# Patient Record
Sex: FEMALE | Race: WHITE | Employment: FULL TIME | URBAN - METROPOLITAN AREA
[De-identification: names, ages, dates, MRNs, and addresses within clinical notes are randomized per-mention and may not be internally consistent; named-entity substitution may affect disease eponyms.]

---

## 2019-03-19 ENCOUNTER — OFFICE VISIT (OUTPATIENT)
Dept: FAMILY MEDICINE | Facility: CLINIC | Age: 29
End: 2019-03-19
Payer: COMMERCIAL

## 2019-03-19 VITALS
BODY MASS INDEX: 22.66 KG/M2 | DIASTOLIC BLOOD PRESSURE: 68 MMHG | WEIGHT: 141 LBS | SYSTOLIC BLOOD PRESSURE: 114 MMHG | RESPIRATION RATE: 14 BRPM | HEIGHT: 66 IN | OXYGEN SATURATION: 100 % | TEMPERATURE: 98.1 F | HEART RATE: 66 BPM
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
## 2019-03-19 DIAGNOSIS — Z23 NEED FOR VACCINATION: ICD-10-CM

## 2019-03-19 PROCEDURE — 90715 TDAP VACCINE 7 YRS/> IM: CPT | Performed by: OBSTETRICS & GYNECOLOGY

## 2019-03-19 PROCEDURE — 90471 IMMUNIZATION ADMIN: CPT | Performed by: OBSTETRICS & GYNECOLOGY

## 2019-03-19 PROCEDURE — 99213 OFFICE O/P EST LOW 20 MIN: CPT | Mod: 25 | Performed by: OBSTETRICS & GYNECOLOGY

## 2019-03-19 RX ORDER — NORGESTIMATE AND ETHINYL ESTRADIOL 0.25-0.035
1 KIT ORAL DAILY
COMMUNITY

## 2019-03-19 RX ORDER — AMOXICILLIN AND CLAVULANATE POTASSIUM 500; 125 MG/1; MG/1
TABLET, FILM COATED ORAL
Qty: 15 TABLET | Refills: 0 | Status: SHIPPED | OUTPATIENT
Start: 2019-03-19

## 2019-03-19 SDOH — HEALTH STABILITY: MENTAL HEALTH: HOW OFTEN DO YOU HAVE A DRINK CONTAINING ALCOHOL?: NEVER

## 2019-03-19 ASSESSMENT — PAIN SCALES - GENERAL: PAINLEVEL: NO PAIN (1)

## 2019-03-19 ASSESSMENT — MIFFLIN-ST. JEOR: SCORE: 1381.32

## 2019-03-19 NOTE — PROGRESS NOTES
Subjective:  She was bitten by her mother's little poodle 4 days ago. She washed it out well with soap and water.  Since then it has not hurt.  She has had it dressed daily with a bandage and Neosporin.  Tetanus shot uncertain.  He has a little tingling just beyond the puncture wound but no pain whatsoever and no redness or swelling.      The past medical history, social history, past surgical history and family history as shown below have been reviewed by me today.  History reviewed. No pertinent past medical history.   No Known Allergies  Current Outpatient Medications   Medication Sig Dispense Refill     norgestimate-ethinyl estradiol (ORTHO-CYCLEN/SPRINTEC) 0.25-35 MG-MCG tablet Take 1 tablet by mouth daily       History reviewed. No pertinent surgical history.  Social History     Socioeconomic History     Marital status:      Spouse name: None     Number of children: None     Years of education: None     Highest education level: None   Occupational History     None   Social Needs     Financial resource strain: None     Food insecurity:     Worry: None     Inability: None     Transportation needs:     Medical: None     Non-medical: None   Tobacco Use     Smoking status: Never Smoker     Smokeless tobacco: Never Used   Substance and Sexual Activity     Alcohol use: No     Frequency: Never     Drug use: No     Sexual activity: Yes     Partners: Male     Birth control/protection: None   Lifestyle     Physical activity:     Days per week: None     Minutes per session: None     Stress: None   Relationships     Social connections:     Talks on phone: None     Gets together: None     Attends [employer] orthodox service: None     Active member of club or organization: None     Attends meetings of clubs or organizations: None     Relationship status: None     Intimate partner violence:     Fear of current or ex partner: None     Emotionally abused: None     Physically abused: None     Forced sexual activity: None   Other  "Topics Concern     None   Social History Narrative     None     History reviewed. No pertinent family history.    ROS: A 12 point review of systems was done. Except for what is listed above in the HPI, the systems review is negative .      Objective: Vital signs: Blood pressure 114/68, pulse 66, temperature 98.1  F (36.7  C), temperature source Temporal, resp. rate 14, height 1.676 m (5' 6\"), weight 64 kg (141 lb), SpO2 100 %.    Exam of the finger reveals no tenderness at all.  There is no redness or swelling.  No discharge from the puncture wound.  It appears to be healing well.  It is a tiny laceration less than a centimeter long.  She has normal sensation along the sides of her finger all the way to the tip but right beyond the puncture there is a little tingling.  She has normal capillary refill in the finger.        Assessment/Plan: A total of 15 minutes were spent face-to-face with this patient during today's consultation, with more than 50% of that time devoted to conversation and counseling about the management decisions.      1.  Puncture wound/laceration of right second finger on the volar aspect between the middle phalanx and the distal phalanx.  No evidence of infection.  It is 4 days old already.  She washed it out well at the time of injury.  The dog is up-to-date on its vaccinations.  We will give her a Tdap today.  See Rx for Augmentin.. I explained that antibiotics can cause a rash or allergic reaction to develop and so the medication should be stopped if this occurs. Also there is a risk of diarrhea or clostridium difficile pseudomembranous enterocolitis with any antibiotic use so it should be stopped if diarrhea develops and then the clinic should be called so that we have a followup evaluation.      2.  I am giving her the antibiotic as prophylaxis but she knows to stop it if she develops any side effects    3.  Recheck in 1 week if this does not heal completely and sooner if she has any alarm " that that is infected.        DOT Munguia MD

## 2019-03-19 NOTE — NURSING NOTE
Prior to injection, verified patient identity using patient's name and date of birth.  Due to injection administration, patient instructed to remain in clinic for 15 minutes  afterwards, and to report any adverse reaction to me immediately.    TDaP    Drug Amount Wasted:  None.  Vial/Syringe: Single dose vial  Expiration Date:  12/15/2020  Bambi Kelley CMA

## 2019-03-22 ENCOUNTER — HEALTH MAINTENANCE LETTER (OUTPATIENT)
Age: 29
End: 2019-03-22

## 2020-03-11 ENCOUNTER — HEALTH MAINTENANCE LETTER (OUTPATIENT)
Age: 30
End: 2020-03-11

## 2021-01-03 ENCOUNTER — HEALTH MAINTENANCE LETTER (OUTPATIENT)
Age: 31
End: 2021-01-03

## 2021-04-25 ENCOUNTER — HEALTH MAINTENANCE LETTER (OUTPATIENT)
Age: 31
End: 2021-04-25

## 2021-07-02 ENCOUNTER — HOSPITAL ENCOUNTER (OUTPATIENT)
Dept: PHYSICAL THERAPY | Facility: CLINIC | Age: 31
Setting detail: THERAPIES SERIES
End: 2021-07-02
Attending: DENTIST
Payer: COMMERCIAL

## 2021-07-02 PROCEDURE — 97112 NEUROMUSCULAR REEDUCATION: CPT | Mod: GP | Performed by: PHYSICAL THERAPIST

## 2021-07-02 PROCEDURE — 97161 PT EVAL LOW COMPLEX 20 MIN: CPT | Mod: GP | Performed by: PHYSICAL THERAPIST

## 2021-07-02 PROCEDURE — 97110 THERAPEUTIC EXERCISES: CPT | Mod: GP | Performed by: PHYSICAL THERAPIST

## 2021-07-05 NOTE — PROGRESS NOTES
07/02/21 0815   General Information   Type of Visit Initial OP Ortho PT Evaluation   Start of Care Date 07/02/21   Referring Physician Geovanna Rodriguez PA-C ([facility])   Patient/Family Goals Statement Full range of movement, without pain. Bend, squat, kneel with knee.   Orders Evaluate and Treat   Date of Order 06/30/21   Certification Required? No   Medical Diagnosis s/p chondroplasty patella (right)   Surgical/Medical history reviewed Yes   Precautions/Limitations no known precautions/limitations   General Information Comments PMH: nothing to report per pt   Body Part(s)   Body Part(s) Knee   Presentation and Etiology   Pertinent history of current problem (include personal factors and/or comorbidities that impact the POC) Knee just slowly worse--maybe from bending with daughter.  In Jan 2021 noticed cartilege is torn and tried to heal it with ibuprofen, but did surgery to take off the torn piece 6/22/21 because ROM was limited, and a little pain on stairs.  Now, post surgery, foot pain mostly and some local sharper pain over insertion sites.     Impairments A. Pain;D. Decreased ROM;C. Swelling;H. Impaired gait   Functional Limitations perform activities of daily living;perform required work activities;perform desired leisure / sports activities   Symptom Location R knee   How/Where did it occur From insidious onset   Pain/symptoms exacerbated by   (squatting, bending knee)   Fall Risk Screen   Fall screen completed by PT   Have you fallen 2 or more times in the past year? No   Have you fallen and had an injury in the past year? No   Is patient a fall risk? No   Abuse Screen (yes response referral indicated)   Physical Signs of Abuse Present no   Functional Scales   Functional Scales Other   Other Scales  LEFS = 44/80   Knee Objective Findings   Side (if bilateral, select both right and left) Right   Observation decreased R knee ROM used    Posture holds knees and toes in when sitting   Gait/Locomotion uses  no AD, mild antalgic gait, poor heel strike and toe off   Knee ROM Comment seated flex 118 degrees, minus 2 degrees extension.    Knee/Hip Strength Comments bilat knee flex, ext, DF all 5/5.    Palpation mild anterior knee edema john-patellar, especially superior.   Right Quad Set Strength slow, inconsistent activation   R VMO Strength slow, inconsistent activation   Planned Therapy Interventions   Planned Therapy Interventions neuromuscular re-education;ROM;strengthening   Planned Therapy Interventions Comment manual possible to stretch mm in 3D fashion and help activation return   Clinical Impression   Criteria for Skilled Therapeutic Interventions Met yes, treatment indicated   PT Diagnosis R knee pain, s/p chondroplasty patella   Influenced by the following impairments pain, decreased ROM, impaired gait   Functional limitations due to impairments squatting, bending R knee, prolonged walking and standing   Clinical Presentation Stable/Uncomplicated   Clinical Decision Making (Complexity) Low complexity   Therapy Frequency other (see comments)  (1x in 2 weeks)   Predicted Duration of Therapy Intervention (days/wks) 2 visits then weekly to further control pain or each 2 weeks to progress HEP   Risk & Benefits of therapy have been explained Yes   Patient, Family & other staff in agreement with plan of care Yes   Clinical Impression Comments poor R quad activation s/p surgery   Education Assessment   Preferred Learning Style Listening   Barriers to Learning No barriers   ORTHO GOALS   PT Ortho Eval Goals 1;2   Ortho Goal 1   Goal Identifier squatting   Goal Description Svetlana will be able to squat down to children for daily activity without pain in R knee.    Target Date 08/13/21   Ortho Goal 2   Goal Identifier stairs   Goal Description Svetlana will be able to complete a flight of stairs without R knee pain.   Target Date 08/13/21   Total Evaluation Time   PT Eval, Low Complexity Minutes ([id number]) 25

## 2021-10-10 ENCOUNTER — HEALTH MAINTENANCE LETTER (OUTPATIENT)
Age: 31
End: 2021-10-10

## 2021-10-25 NOTE — PROGRESS NOTES
Outpatient Physical Therapy Discharge Note     Patient: Svetlana Ziegler  : 1990    Beginning/End Dates of Reporting Period:  21 to 21    Referring Provider: Geovanna Rodriguez PA-C ([facility])    Therapy Diagnosis: R knee pain, s/p chondroplasty patella     Client Self Report: Pt cancelled appt 21 reporting she is doing well and appt not needed.    Objective Measurements:   not present at time of discharge.    Goals:  Goal Identifier squatting   Goal Description Svetlana will be able to squat down to children for daily activity without pain in R knee.    Target Date 21   Date Met      Progress (detail required for progress note):       Goal Identifier stairs   Goal Description Svetlana will be able to complete a flight of stairs without R knee pain.   Target Date 21   Date Met      Progress (detail required for progress note):           Plan:  Discharge from therapy.    Discharge:    Reason for Discharge: Patient has met all goals, per her being comfortable to discharge.       Equipment Issued: .    Discharge Plan: Patient to continue home program.

## 2022-03-16 ENCOUNTER — TRANSFERRED RECORDS (OUTPATIENT)
Dept: HEALTH INFORMATION MANAGEMENT | Facility: CLINIC | Age: 32
End: 2022-03-16

## 2022-04-28 ENCOUNTER — TRANSFERRED RECORDS (OUTPATIENT)
Dept: HEALTH INFORMATION MANAGEMENT | Facility: CLINIC | Age: 32
End: 2022-04-28

## 2022-05-21 ENCOUNTER — HEALTH MAINTENANCE LETTER (OUTPATIENT)
Age: 32
End: 2022-05-21

## 2022-05-24 ENCOUNTER — VIRTUAL VISIT (OUTPATIENT)
Dept: URGENT CARE | Facility: CLINIC | Age: 32
End: 2022-05-24
Payer: COMMERCIAL

## 2022-05-24 DIAGNOSIS — U07.1 INFECTION DUE TO 2019 NOVEL CORONAVIRUS: Primary | ICD-10-CM

## 2022-05-24 PROCEDURE — 99204 OFFICE O/P NEW MOD 45 MIN: CPT | Mod: 95 | Performed by: EMERGENCY MEDICINE

## 2022-05-24 NOTE — PROGRESS NOTES
"Video visit:    Start time: 2:02 PM  Stop time: 2:12 PM    Duration: 10 minutes  The patient has been notified of following:     \"This video visit will be conducted via a call between you and your physician/provider. We have found that certain health care needs can be provided without the need for a physical exam.  This service lets us provide the care you need with a short phone conversation.  If a prescription is necessary we can send it directly to your pharmacy.  If lab work is needed we can place an order for that and you can then stop by our lab to have the test done at a later time.    Video visits are billed at different rates depending on your insurance coverage. During this emergency period, for some insurers they may be billed the same as an in-person visit.  Please reach out to your insurance provider with any questions.    If during the course of the call the physician/provider feels a telephone visit is not appropriate, you will not be charged for this service.\"    Patient has given verbal consent for video visit?  Yes    What phone number would you like to be contacted at? [phone]  How would you like to obtain your AVS? MyChart    Subjective   CC: Svetlana Ziegler  is a 31 year old female who presents via phone visit today for the following health issues:   Chief Complaint   Patient presents with     Infection        COVID-19 Symptom Review  How many days ago did these symptoms start? 1    Are any of the following symptoms significant for you?    New or worsening difficulty breathing? No    Worsening cough? Yes, it's a dry cough.     Fever or chills? No    Headache: YES-     Sore throat: no    Chest pain: no    Diarrhea: no    Body aches? YES-     What treatments has patient tried?    Does patient live in a nursing home, group home, or shelter? no  Does patient have a way to get food/medications during quarantined? Yes, I have a friend or family member who can help me. and Yes "                       Reviewed and updated as needed this visit by Provider                    Review of Systems         Objective    Patient is alert and oriented.  No acute distress.  Not dyspneic appearing on video visit.              Assessment/Plan:  Patient is a 31-year-old female who is unvaccinated with 1 day history of COVID symptoms.  She tested positive yesterday only due to exposure.  Today she developed symptoms of headache, body aches, slight nausea which is now resolved, slight cough.  No shortness of breath.  Patient is 20 weeks pregnant.  Paxlovid is deemed safe for pregnancy but I still will have patient check with her OB prior to starting the medication.      Kleber Younger MD

## 2022-07-26 ENCOUNTER — TRANSFERRED RECORDS (OUTPATIENT)
Dept: HEALTH INFORMATION MANAGEMENT | Facility: CLINIC | Age: 32
End: 2022-07-26

## 2022-09-18 ENCOUNTER — HEALTH MAINTENANCE LETTER (OUTPATIENT)
Age: 32
End: 2022-09-18

## 2023-06-04 ENCOUNTER — HEALTH MAINTENANCE LETTER (OUTPATIENT)
Age: 33
End: 2023-06-04

## 2024-07-14 ENCOUNTER — HEALTH MAINTENANCE LETTER (OUTPATIENT)
Age: 34
End: 2024-07-14